# Patient Record
Sex: FEMALE | Race: WHITE | NOT HISPANIC OR LATINO
[De-identification: names, ages, dates, MRNs, and addresses within clinical notes are randomized per-mention and may not be internally consistent; named-entity substitution may affect disease eponyms.]

---

## 2017-12-05 ENCOUNTER — APPOINTMENT (OUTPATIENT)
Dept: ORTHOPEDIC SURGERY | Facility: CLINIC | Age: 62
End: 2017-12-05
Payer: COMMERCIAL

## 2017-12-05 VITALS
WEIGHT: 158 LBS | BODY MASS INDEX: 23.95 KG/M2 | HEART RATE: 66 BPM | SYSTOLIC BLOOD PRESSURE: 115 MMHG | HEIGHT: 68 IN | DIASTOLIC BLOOD PRESSURE: 73 MMHG

## 2017-12-05 DIAGNOSIS — Z78.9 OTHER SPECIFIED HEALTH STATUS: ICD-10-CM

## 2017-12-05 DIAGNOSIS — Z80.9 FAMILY HISTORY OF MALIGNANT NEOPLASM, UNSPECIFIED: ICD-10-CM

## 2017-12-05 DIAGNOSIS — M18.0 BILATERAL PRIMARY OSTEOARTHRITIS OF FIRST CARPOMETACARPAL JOINTS: ICD-10-CM

## 2017-12-05 PROCEDURE — 99203 OFFICE O/P NEW LOW 30 MIN: CPT

## 2017-12-05 PROCEDURE — 73130 X-RAY EXAM OF HAND: CPT | Mod: 50

## 2017-12-05 RX ORDER — LEVOTHYROXINE SODIUM 125 UG/1
125 TABLET ORAL
Qty: 90 | Refills: 0 | Status: ACTIVE | COMMUNITY
Start: 2017-11-17

## 2017-12-05 RX ORDER — MELOXICAM 7.5 MG/1
7.5 TABLET ORAL
Qty: 1 | Refills: 2 | Status: ACTIVE | COMMUNITY
Start: 2017-12-05 | End: 1900-01-01

## 2017-12-05 RX ORDER — SODIUM SULFATE, POTASSIUM SULFATE, MAGNESIUM SULFATE 17.5; 3.13; 1.6 G/ML; G/ML; G/ML
17.5-3.13-1.6 SOLUTION, CONCENTRATE ORAL
Qty: 354 | Refills: 0 | Status: ACTIVE | COMMUNITY
Start: 2017-10-25

## 2017-12-05 RX ORDER — BETAMETHASONE DIPROPIONATE 0.5 MG/G
0.05 OINTMENT TOPICAL
Qty: 45 | Refills: 0 | Status: ACTIVE | COMMUNITY
Start: 2017-11-16

## 2017-12-05 RX ORDER — CLONAZEPAM 1 MG/1
1 TABLET ORAL
Qty: 60 | Refills: 0 | Status: ACTIVE | COMMUNITY
Start: 2017-11-16

## 2022-06-28 ENCOUNTER — APPOINTMENT (OUTPATIENT)
Dept: ORTHOPEDIC SURGERY | Facility: CLINIC | Age: 67
End: 2022-06-28

## 2022-06-28 VITALS — HEIGHT: 68 IN | BODY MASS INDEX: 23.34 KG/M2 | WEIGHT: 154 LBS

## 2022-06-28 DIAGNOSIS — Z00.00 ENCOUNTER FOR GENERAL ADULT MEDICAL EXAMINATION W/OUT ABNORMAL FINDINGS: ICD-10-CM

## 2022-06-28 DIAGNOSIS — M77.02 MEDIAL EPICONDYLITIS, LEFT ELBOW: ICD-10-CM

## 2022-06-28 PROCEDURE — 73080 X-RAY EXAM OF ELBOW: CPT | Mod: RT

## 2022-06-28 PROCEDURE — 99203 OFFICE O/P NEW LOW 30 MIN: CPT

## 2022-06-28 RX ORDER — DICLOFENAC SODIUM 1% 10 MG/G
1 GEL TOPICAL DAILY
Qty: 1 | Refills: 1 | Status: ACTIVE | COMMUNITY
Start: 2022-06-28 | End: 1900-01-01

## 2022-06-28 NOTE — PHYSICAL EXAM
[Right] : right elbow [NL (150)] : flexion 150 degrees [NL (0)] : extension 0 degrees [NL (90)] : supination 90 degrees [5___] : supination 5[unfilled]/5 [] : motor exam 5/5  [Left] : left elbow [There are no fractures, subluxations or dislocations. No significant abnormalities are seen] : There are no fractures, subluxations or dislocations. No significant abnormalities are seen

## 2022-06-28 NOTE — HISTORY OF PRESENT ILLNESS
[5] : 5 [0] : 0 [Dull/Aching] : dull/aching [Intermittent] : intermittent [de-identified] : 06/28/2022 Ms. LOS MICHAEL,  66 year female , presents today for right elbow. Reports medial right elbow pain. Pain when lifting or carrying objects. [] : no [FreeTextEntry1] : right elbow [FreeTextEntry5] : no known injury. pt states that she cannot lift weights because her right elbow will start to hurt [de-identified] : lifting weights

## 2022-06-28 NOTE — DISCUSSION/SUMMARY
[de-identified] : 66f with left elbow medial epicondylitis\par 1) diclofenac gel rx\par 2) counterforce brace\par 3) cryotherapy, rest and activity modification\par 4) home exercise info provided\par 5) discussed availability of csi\par \par Entered by Ina Delacruz acting as scribe.\par \par

## 2022-07-05 ENCOUNTER — FORM ENCOUNTER (OUTPATIENT)
Age: 67
End: 2022-07-05

## 2023-01-10 ENCOUNTER — NON-APPOINTMENT (OUTPATIENT)
Age: 68
End: 2023-01-10

## 2023-01-10 ENCOUNTER — APPOINTMENT (OUTPATIENT)
Dept: OTOLARYNGOLOGY | Facility: CLINIC | Age: 68
End: 2023-01-10
Payer: MEDICARE

## 2023-01-10 VITALS
HEIGHT: 68 IN | SYSTOLIC BLOOD PRESSURE: 105 MMHG | DIASTOLIC BLOOD PRESSURE: 70 MMHG | HEART RATE: 76 BPM | BODY MASS INDEX: 23.95 KG/M2 | WEIGHT: 158 LBS

## 2023-01-10 PROCEDURE — 99203 OFFICE O/P NEW LOW 30 MIN: CPT

## 2023-01-10 PROCEDURE — 92588 EVOKED AUDITORY TST COMPLETE: CPT

## 2023-01-10 PROCEDURE — 92557 COMPREHENSIVE HEARING TEST: CPT

## 2023-01-10 PROCEDURE — 92550 TYMPANOMETRY & REFLEX THRESH: CPT

## 2023-01-10 NOTE — ADDENDUM
[FreeTextEntry1] : Documented by Sebastian Gilliam acting as scribe for Dr. Romero on 01/10/2023.\par \par All Medical record entries made by the Scribe were at my, Dr. Romero, direction and personally dictated by me on 01/10/2023. I have reviewed the chart and agree that the record accurately reflects my personal performance of the history, physical exam, assessment and plan. I have also personally directed, reviewed, and agreed with the discharge instructions.

## 2023-01-10 NOTE — HISTORY OF PRESENT ILLNESS
[de-identified] : Pt presents today c/o tinnitus b/l. Describes the noise as a solid noise. Pt cannot remember when the onset of her tinnitus was. Pt notes that a few years ago she had ear otalgia which self resolved. Pt reports that she went to an outside audiologist who found a slight hearing loss in both ears. Denies recent noise exposure, loud noises, drinking tonic water.

## 2023-01-10 NOTE — PHYSICAL EXAM
[] : septum deviated to the right [Normal] : mucosa is normal [Midline] : trachea located in midline position [Removed] : palatine tonsils previously removed [de-identified] : thyroid scar [FreeTextEntry5] : no response to tuning forks [de-identified] : inflamed, swollen turbs [FreeTextEntry2] : Sinuses nontender to percussion. Sensations intact.

## 2023-01-10 NOTE — ASSESSMENT
[FreeTextEntry1] : Reviewed and reconciled medications, allergies, PMHx, PSHx, SocHx, FMHx. \par \par h/o thyroidectomy\par b/l tinnitus\par \par Audio: Tymps: Type A, au\par ETF: Normal, au \par Borderline -8khz w/ mild snhl dip at 1khz, au \par b/l 100% discrim at 65db\par \par Plan:\par Audio - results interpreted by Dr. Romero and reviewed with the patient. ABR. Start Lipoflavonoids after ABR. FU after test.

## 2023-01-10 NOTE — CONSULT LETTER
[Dear  ___] : Dear  [unfilled], [Consult Letter:] : I had the pleasure of evaluating your patient, [unfilled]. [Please see my note below.] : Please see my note below. [Consult Closing:] : Thank you very much for allowing me to participate in the care of this patient.  If you have any questions, please do not hesitate to contact me. [Sincerely,] : Sincerely, [FreeTextEntry3] : Willis Romero MD FACS

## 2023-01-10 NOTE — DATA REVIEWED
[de-identified] : Tymps: Type A, au\par ETF: Normal, au \par Borderline -8khz w/ mild snhl dip at 1khz, au

## 2023-01-18 ENCOUNTER — TRANSCRIPTION ENCOUNTER (OUTPATIENT)
Age: 68
End: 2023-01-18

## 2023-01-18 ENCOUNTER — APPOINTMENT (OUTPATIENT)
Dept: OTOLARYNGOLOGY | Facility: CLINIC | Age: 68
End: 2023-01-18
Payer: MEDICARE

## 2023-01-18 PROCEDURE — 92653 AEP NEURODIAGNOSTIC I&R: CPT

## 2023-01-24 ENCOUNTER — APPOINTMENT (OUTPATIENT)
Dept: OTOLARYNGOLOGY | Facility: CLINIC | Age: 68
End: 2023-01-24
Payer: MEDICARE

## 2023-01-24 VITALS
BODY MASS INDEX: 23.79 KG/M2 | HEIGHT: 68 IN | SYSTOLIC BLOOD PRESSURE: 112 MMHG | DIASTOLIC BLOOD PRESSURE: 78 MMHG | WEIGHT: 157 LBS | HEART RATE: 66 BPM

## 2023-01-24 DIAGNOSIS — E03.2 HYPOTHYROIDISM DUE TO MEDICAMENTS AND OTHER EXOGENOUS SUBSTANCES: ICD-10-CM

## 2023-01-24 PROCEDURE — 99214 OFFICE O/P EST MOD 30 MIN: CPT

## 2023-01-24 NOTE — CONSULT LETTER
[Dear  ___] : Dear  [unfilled], [Courtesy Letter:] : I had the pleasure of seeing your patient, [unfilled], in my office today. [Please see my note below.] : Please see my note below. [Consult Closing:] : Thank you very much for allowing me to participate in the care of this patient.  If you have any questions, please do not hesitate to contact me. [Sincerely,] : Sincerely, [FreeTextEntry1] : Willis Romero MD FACS

## 2023-01-24 NOTE — HISTORY OF PRESENT ILLNESS
[de-identified] : Pt. with h/o b/l tinnitus and HL presents today for ABR results. Patient states she doesn’t notice any problem with her hearing, just the tinnitus. Patient states it dosent bother her enough at this point to do anything.

## 2023-01-24 NOTE — ASSESSMENT
[FreeTextEntry1] : Reviewed and reconciled medications, allergies, PMHx, PSHx, SocHx, FMHx \par \par Pt. with h/o b/l tinnitus and HL presents today for ABR results. Patient states she doesn’t notice any problem with her hearing, just the tinnitus. Patient states it dosent bother her enough at this point to do anything. \par \par Audio 01/10/23 : Tymps: Type A, au\par -acoustic notch\par ETF: Normal, au \par Borderline -8khz w/ mild snhl dip at 1khz, au \par b/l 100% discrim at 65db\par \par ABR 01/18/23:\par -normal\par \par Plan: ABR results discussed with patient. Start Lipoflavinoids and Arch Tinnitus Formula. Use white noise machine. \par \par \par

## 2023-04-17 ENCOUNTER — APPOINTMENT (OUTPATIENT)
Dept: ORTHOPEDIC SURGERY | Facility: CLINIC | Age: 68
End: 2023-04-17
Payer: MEDICARE

## 2023-04-17 DIAGNOSIS — S92.211A DISPLACED FRACTURE OF CUBOID BONE OF RIGHT FOOT, INITIAL ENCOUNTER FOR CLOSED FRACTURE: ICD-10-CM

## 2023-04-17 DIAGNOSIS — S93.321A: ICD-10-CM

## 2023-04-17 DIAGNOSIS — S92.221A: ICD-10-CM

## 2023-04-17 DIAGNOSIS — S92.231A: ICD-10-CM

## 2023-04-17 DIAGNOSIS — S92.321A DISPLACED FRACTURE OF SECOND METATARSAL BONE, RIGHT FOOT, INITIAL ENCOUNTER FOR CLOSED FRACTURE: ICD-10-CM

## 2023-04-17 DIAGNOSIS — S92.241A DISPLACED FRACTURE OF MEDIAL CUNEIFORM OF RIGHT FOOT, INITIAL ENCOUNTER FOR CLOSED FRACTURE: ICD-10-CM

## 2023-04-17 PROCEDURE — 99215 OFFICE O/P EST HI 40 MIN: CPT

## 2023-04-17 PROCEDURE — 73630 X-RAY EXAM OF FOOT: CPT | Mod: RT

## 2023-04-17 NOTE — HISTORY OF PRESENT ILLNESS
[10] : 10 [5] : 5 [Throbbing] : throbbing [de-identified] : 4/17/23: fall 1 week ago w/ foot pain. [facility]. no prior sig foot probs. denies dm/tob.  [] : no [FreeTextEntry1] : RT Foot [FreeTextEntry3] : 4/10/2023 [FreeTextEntry5] : Keri Flower is a 67 Y.O female coming in with RIGHT FOOT pain. She states she fell over a baby gate last week and twisted the foot. Denies Numbness/tingling  [de-identified] : X-ray, CT

## 2023-04-17 NOTE — PHYSICAL EXAM
[Right] : right foot and ankle [Moderate] : moderate swelling of dorsal foot [5___] : [facility] 5[unfilled]/5 [2+] : dorsalis pedis pulse: 2+ [] : no achilles tendon tenderness [de-identified] : knee scooter [de-identified] : plantar flexion 30 degrees [TWNoteComboBox7] : dorsiflexion 10 degrees

## 2023-04-17 NOTE — ASSESSMENT
[FreeTextEntry1] : nwb in cam boot\par discussed dx at length\par given displacement and comminution of 2nd tmt joint, recommend orif with arthrodesis\par discussed surgical and non surgical options and pros/cons for both\par all questions answered and would like to proceed\par elevate\par tylenol prn\par \par The pros, cons, risks, benefits, alternatives have been discussed.  The risks include but are not limited to infection, bleeding, injury to small nerves and blood vessels, pain, stiffness, progression, over correction, under correction, recurrence, hardware failure, need for additional procedures, dvt, PE, amputation and death. Expected recovery time was discussed. All the patient's questions were answered and they would like to proceed.\par

## 2023-04-17 NOTE — DATA REVIEWED
[CT Scan] : CT scan [Right] : of the right [Foot] : foot [I reviewed the films/CD and additionally noted] : I reviewed the films/CD and additionally noted [FreeTextEntry1] : prox 2nd mt and mid and med and lat cuneiform fxs w/ subluxation of 2nd TMT; cuboid fx

## 2023-04-21 ENCOUNTER — OUTPATIENT (OUTPATIENT)
Dept: OUTPATIENT SERVICES | Facility: HOSPITAL | Age: 68
LOS: 1 days | End: 2023-04-21
Payer: MEDICARE

## 2023-04-21 VITALS
SYSTOLIC BLOOD PRESSURE: 96 MMHG | HEART RATE: 90 BPM | TEMPERATURE: 98 F | WEIGHT: 156.97 LBS | RESPIRATION RATE: 14 BRPM | DIASTOLIC BLOOD PRESSURE: 50 MMHG | HEIGHT: 68 IN | OXYGEN SATURATION: 99 %

## 2023-04-21 DIAGNOSIS — S92.309A FRACTURE OF UNSPECIFIED METATARSAL BONE(S), UNSPECIFIED FOOT, INITIAL ENCOUNTER FOR CLOSED FRACTURE: ICD-10-CM

## 2023-04-21 DIAGNOSIS — E89.0 POSTPROCEDURAL HYPOTHYROIDISM: Chronic | ICD-10-CM

## 2023-04-21 DIAGNOSIS — Z01.818 ENCOUNTER FOR OTHER PREPROCEDURAL EXAMINATION: ICD-10-CM

## 2023-04-21 DIAGNOSIS — S93.321A SUBLUXATION OF TARSOMETATARSAL JOINT OF RIGHT FOOT, INITIAL ENCOUNTER: ICD-10-CM

## 2023-04-21 DIAGNOSIS — Z90.710 ACQUIRED ABSENCE OF BOTH CERVIX AND UTERUS: Chronic | ICD-10-CM

## 2023-04-21 LAB
ANION GAP SERPL CALC-SCNC: 7 MMOL/L — SIGNIFICANT CHANGE UP (ref 5–17)
BUN SERPL-MCNC: 18 MG/DL — SIGNIFICANT CHANGE UP (ref 7–23)
CALCIUM SERPL-MCNC: 9.2 MG/DL — SIGNIFICANT CHANGE UP (ref 8.4–10.5)
CHLORIDE SERPL-SCNC: 100 MMOL/L — SIGNIFICANT CHANGE UP (ref 96–108)
CO2 SERPL-SCNC: 30 MMOL/L — SIGNIFICANT CHANGE UP (ref 22–31)
CREAT SERPL-MCNC: 0.8 MG/DL — SIGNIFICANT CHANGE UP (ref 0.5–1.3)
EGFR: 81 ML/MIN/1.73M2 — SIGNIFICANT CHANGE UP
GLUCOSE SERPL-MCNC: 125 MG/DL — HIGH (ref 70–99)
HCT VFR BLD CALC: 42.3 % — SIGNIFICANT CHANGE UP (ref 34.5–45)
HGB BLD-MCNC: 14.4 G/DL — SIGNIFICANT CHANGE UP (ref 11.5–15.5)
MCHC RBC-ENTMCNC: 31.3 PG — SIGNIFICANT CHANGE UP (ref 27–34)
MCHC RBC-ENTMCNC: 34 GM/DL — SIGNIFICANT CHANGE UP (ref 32–36)
MCV RBC AUTO: 92 FL — SIGNIFICANT CHANGE UP (ref 80–100)
NRBC # BLD: 0 /100 WBCS — SIGNIFICANT CHANGE UP (ref 0–0)
PLATELET # BLD AUTO: 329 K/UL — SIGNIFICANT CHANGE UP (ref 150–400)
POTASSIUM SERPL-MCNC: 4.1 MMOL/L — SIGNIFICANT CHANGE UP (ref 3.5–5.3)
POTASSIUM SERPL-SCNC: 4.1 MMOL/L — SIGNIFICANT CHANGE UP (ref 3.5–5.3)
RBC # BLD: 4.6 M/UL — SIGNIFICANT CHANGE UP (ref 3.8–5.2)
RBC # FLD: 12.7 % — SIGNIFICANT CHANGE UP (ref 10.3–14.5)
SODIUM SERPL-SCNC: 137 MMOL/L — SIGNIFICANT CHANGE UP (ref 135–145)
WBC # BLD: 6.84 K/UL — SIGNIFICANT CHANGE UP (ref 3.8–10.5)
WBC # FLD AUTO: 6.84 K/UL — SIGNIFICANT CHANGE UP (ref 3.8–10.5)

## 2023-04-21 PROCEDURE — 36415 COLL VENOUS BLD VENIPUNCTURE: CPT

## 2023-04-21 PROCEDURE — 93005 ELECTROCARDIOGRAM TRACING: CPT

## 2023-04-21 PROCEDURE — 85027 COMPLETE CBC AUTOMATED: CPT

## 2023-04-21 PROCEDURE — 80048 BASIC METABOLIC PNL TOTAL CA: CPT

## 2023-04-21 PROCEDURE — 93010 ELECTROCARDIOGRAM REPORT: CPT

## 2023-04-21 PROCEDURE — G0463: CPT

## 2023-04-21 NOTE — H&P PST ADULT - MUSCULOSKELETAL COMMENTS
right foot on Cama boot ; tender on palpation , pain with ROM right foot; metatarsal fracture right foot on Cama boot ; tender on palpation , swelling noted , pain with ROM, no paresthesia

## 2023-04-21 NOTE — H&P PST ADULT - PROBLEM SELECTOR PLAN 1
scheduled for ORIF right foot metatarsal on 4/26/23  will obtain medical clearance   pre op instructions

## 2023-04-21 NOTE — H&P PST ADULT - NSICDXPASTSURGICALHX_GEN_ALL_CORE_FT
PAST SURGICAL HISTORY:  H/O total hysterectomy      PAST SURGICAL HISTORY:  H/O total hysterectomy     S/P thyroidectomy

## 2023-04-21 NOTE — H&P PST ADULT - NSICDXPROCEDURE_GEN_ALL_CORE_FT
PROCEDURES:  Open reduction and internal fixation of fracture of metatarsal bone of right foot 21-Apr-2023 12:01:01  Ruth Plaza

## 2023-04-21 NOTE — H&P PST ADULT - NSANTHOSAYNRD_GEN_A_CORE
No. ABELARDO screening performed.  STOP BANG Legend: 0-2 = LOW Risk; 3-4 = INTERMEDIATE Risk; 5-8 = HIGH Risk

## 2023-04-21 NOTE — H&P PST ADULT - NSICDXFAMILYHX_GEN_ALL_CORE_FT
FAMILY HISTORY:  Father  Still living? No  FH: diabetes mellitus, Age at diagnosis: Age Unknown  FH: heart disease, Age at diagnosis: Age Unknown    Mother  Still living? No  Family history of CLL (chronic lymphoid leukemia), Age at diagnosis: Age Unknown

## 2023-04-21 NOTE — H&P PST ADULT - HISTORY OF PRESENT ILLNESS
This is a 68 y/o female who sustained right foot fracture presents with right foot pain , swelling and unable to bear weight . patient states she fell over a baby gate and twisted the foot last week . went to ER at [facility] . x ray showed right metatarsal fracture . placed on cama boot  scheduled for right foot ORIF metatarsal fracture on 4/26/23

## 2023-04-21 NOTE — H&P PST ADULT - PRO ARRIVE FROM
Called the patient to get him scheduled to see Dr. Calderon. Couldn't leave a message will try to call back. FP      ----- Message from Rafael Scruggs sent at 6/5/2018 12:15 PM CDT -----  Contact: Pt   Pt called to request an appointment having right knee pain..[phone] (home)         home

## 2023-04-25 ENCOUNTER — TRANSCRIPTION ENCOUNTER (OUTPATIENT)
Age: 68
End: 2023-04-25

## 2023-04-26 ENCOUNTER — OUTPATIENT (OUTPATIENT)
Dept: OUTPATIENT SERVICES | Facility: HOSPITAL | Age: 68
LOS: 1 days | End: 2023-04-26
Payer: MEDICARE

## 2023-04-26 ENCOUNTER — TRANSCRIPTION ENCOUNTER (OUTPATIENT)
Age: 68
End: 2023-04-26

## 2023-04-26 ENCOUNTER — APPOINTMENT (OUTPATIENT)
Dept: ORTHOPEDIC SURGERY | Facility: HOSPITAL | Age: 68
End: 2023-04-26
Payer: MEDICARE

## 2023-04-26 VITALS
HEIGHT: 68 IN | SYSTOLIC BLOOD PRESSURE: 111 MMHG | RESPIRATION RATE: 18 BRPM | DIASTOLIC BLOOD PRESSURE: 66 MMHG | TEMPERATURE: 98 F | OXYGEN SATURATION: 100 % | WEIGHT: 161.82 LBS | HEART RATE: 76 BPM

## 2023-04-26 VITALS
HEART RATE: 80 BPM | TEMPERATURE: 98 F | DIASTOLIC BLOOD PRESSURE: 60 MMHG | OXYGEN SATURATION: 98 % | SYSTOLIC BLOOD PRESSURE: 117 MMHG

## 2023-04-26 DIAGNOSIS — S93.321A SUBLUXATION OF TARSOMETATARSAL JOINT OF RIGHT FOOT, INITIAL ENCOUNTER: ICD-10-CM

## 2023-04-26 DIAGNOSIS — Z01.818 ENCOUNTER FOR OTHER PREPROCEDURAL EXAMINATION: ICD-10-CM

## 2023-04-26 DIAGNOSIS — Z90.710 ACQUIRED ABSENCE OF BOTH CERVIX AND UTERUS: Chronic | ICD-10-CM

## 2023-04-26 DIAGNOSIS — E89.0 POSTPROCEDURAL HYPOTHYROIDISM: Chronic | ICD-10-CM

## 2023-04-26 PROCEDURE — 28485 OPTX METATARSAL FX EACH: CPT | Mod: RT

## 2023-04-26 PROCEDURE — 76000 FLUOROSCOPY <1 HR PHYS/QHP: CPT

## 2023-04-26 PROCEDURE — 28615 REPAIR FOOT DISLOCATION: CPT | Mod: RT

## 2023-04-26 PROCEDURE — 28470 CLTX METATARSAL FX WO MNP EA: CPT | Mod: RT,XS

## 2023-04-26 PROCEDURE — 28450 TX TARSAL B1 FX W/O MNPJ EA: CPT | Mod: RT,XS

## 2023-04-26 PROCEDURE — 28465 OPTX TARSAL BONE FX EACH: CPT | Mod: RT

## 2023-04-26 PROCEDURE — ZZZZZ: CPT

## 2023-04-26 PROCEDURE — C1889: CPT

## 2023-04-26 PROCEDURE — C1734: CPT

## 2023-04-26 PROCEDURE — C1713: CPT

## 2023-04-26 PROCEDURE — 28740 FUSION OF FOOT BONES: CPT | Mod: RT

## 2023-04-26 DEVICE — IMPLANTABLE DEVICE: Type: IMPLANTABLE DEVICE | Site: RIGHT | Status: FUNCTIONAL

## 2023-04-26 DEVICE — K-WIRE WRIGHT MEDICAL (SINGLE TROCAR) 1.6MM X 150MM: Type: IMPLANTABLE DEVICE | Site: RIGHT | Status: FUNCTIONAL

## 2023-04-26 DEVICE — GRAFT BONE AUGMENT INJ 3ML SYNTHETIC: Type: IMPLANTABLE DEVICE | Site: RIGHT | Status: FUNCTIONAL

## 2023-04-26 RX ORDER — ALENDRONATE SODIUM 70 MG/1
1 TABLET ORAL
Refills: 0 | DISCHARGE

## 2023-04-26 RX ORDER — HYDROMORPHONE HYDROCHLORIDE 2 MG/ML
1 INJECTION INTRAMUSCULAR; INTRAVENOUS; SUBCUTANEOUS
Refills: 0 | Status: DISCONTINUED | OUTPATIENT
Start: 2023-04-26 | End: 2023-04-27

## 2023-04-26 RX ORDER — ASPIRIN/CALCIUM CARB/MAGNESIUM 324 MG
1 TABLET ORAL
Qty: 30 | Refills: 0
Start: 2023-04-26

## 2023-04-26 RX ORDER — ONDANSETRON 8 MG/1
4 TABLET, FILM COATED ORAL ONCE
Refills: 0 | Status: DISCONTINUED | OUTPATIENT
Start: 2023-04-26 | End: 2023-04-27

## 2023-04-26 RX ORDER — HYDROCODONE BITARTRATE AND ACETAMINOPHEN 7.5; 325 MG/15ML; MG/15ML
1 SOLUTION ORAL
Qty: 20 | Refills: 0
Start: 2023-04-26

## 2023-04-26 RX ORDER — HYDROMORPHONE HYDROCHLORIDE 2 MG/ML
0.5 INJECTION INTRAMUSCULAR; INTRAVENOUS; SUBCUTANEOUS
Refills: 0 | Status: DISCONTINUED | OUTPATIENT
Start: 2023-04-26 | End: 2023-04-27

## 2023-04-26 RX ORDER — LEVOTHYROXINE SODIUM 125 MCG
1 TABLET ORAL
Refills: 0 | DISCHARGE

## 2023-04-26 RX ORDER — ACETAMINOPHEN 500 MG
1000 TABLET ORAL ONCE
Refills: 0 | Status: COMPLETED | OUTPATIENT
Start: 2023-04-26 | End: 2023-04-26

## 2023-04-26 RX ORDER — APREPITANT 80 MG/1
40 CAPSULE ORAL ONCE
Refills: 0 | Status: COMPLETED | OUTPATIENT
Start: 2023-04-26 | End: 2023-04-26

## 2023-04-26 RX ORDER — SODIUM CHLORIDE 9 MG/ML
1000 INJECTION, SOLUTION INTRAVENOUS
Refills: 0 | Status: DISCONTINUED | OUTPATIENT
Start: 2023-04-26 | End: 2023-04-27

## 2023-04-26 RX ORDER — OXYCODONE HYDROCHLORIDE 5 MG/1
5 TABLET ORAL ONCE
Refills: 0 | Status: DISCONTINUED | OUTPATIENT
Start: 2023-04-26 | End: 2023-04-27

## 2023-04-26 RX ORDER — CEFAZOLIN SODIUM 1 G
2000 VIAL (EA) INJECTION ONCE
Refills: 0 | Status: COMPLETED | OUTPATIENT
Start: 2023-04-26 | End: 2023-04-26

## 2023-04-26 RX ORDER — CHLORHEXIDINE GLUCONATE 213 G/1000ML
1 SOLUTION TOPICAL ONCE
Refills: 0 | Status: COMPLETED | OUTPATIENT
Start: 2023-04-26 | End: 2023-04-26

## 2023-04-26 RX ADMIN — HYDROMORPHONE HYDROCHLORIDE 1 MILLIGRAM(S): 2 INJECTION INTRAMUSCULAR; INTRAVENOUS; SUBCUTANEOUS at 18:14

## 2023-04-26 RX ADMIN — APREPITANT 40 MILLIGRAM(S): 80 CAPSULE ORAL at 13:37

## 2023-04-26 RX ADMIN — HYDROMORPHONE HYDROCHLORIDE 1 MILLIGRAM(S): 2 INJECTION INTRAMUSCULAR; INTRAVENOUS; SUBCUTANEOUS at 17:58

## 2023-04-26 RX ADMIN — CHLORHEXIDINE GLUCONATE 1 APPLICATION(S): 213 SOLUTION TOPICAL at 13:38

## 2023-04-26 NOTE — ASU DISCHARGE PLAN (ADULT/PEDIATRIC) - CARE PROVIDER_API CALL
Mykel Shipman)  Orthopaedic Surgery  [address] [place] [address]  Phone: [phone]  Fax: [phone]  Follow Up Time: 2 weeks

## 2023-04-26 NOTE — BRIEF OPERATIVE NOTE - NSICDXBRIEFPROCEDURE_GEN_ALL_CORE_FT
PROCEDURES:  Open reduction and internal fixation (ORIF) of fracture of metatarsal bone of right foot with repair 26-Apr-2023 17:57:17  Sailaja Goode

## 2023-04-26 NOTE — ASU DISCHARGE PLAN (ADULT/PEDIATRIC) - ASU DC SPECIAL INSTRUCTIONSFT
Non weight bearing right lower extremity, Ambulate with crutches or a walker  Elevate right foot on blankets above heart level (toes above the nose) for 1 hour 3 times a day.  Apply ice pack to  foot for 30 minutes every 3 hours daily.  Keep bandage clean and dry daily.  Cover right foot in shower daily with plastic bag & tape ( or cast bag ) to keep dry.  Wear protective shoe on  foot daily  See the Surgeon in the office in 7-10 days.  Call the Surgeon for fever, severe foot pain.

## 2023-04-26 NOTE — BRIEF OPERATIVE NOTE - NSICDXBRIEFPREOP_GEN_ALL_CORE_FT
PRE-OP DIAGNOSIS:  Lisfranc's dislocation, right, initial encounter 26-Apr-2023 17:59:11  Sailaja Goode

## 2023-04-26 NOTE — BRIEF OPERATIVE NOTE - NSICDXBRIEFPOSTOP_GEN_ALL_CORE_FT
POST-OP DIAGNOSIS:  Lisfranc's dislocation, right, initial encounter 26-Apr-2023 17:58:50  Sailaja Goode

## 2023-05-08 ENCOUNTER — APPOINTMENT (OUTPATIENT)
Dept: ORTHOPEDIC SURGERY | Facility: CLINIC | Age: 68
End: 2023-05-08
Payer: MEDICARE

## 2023-05-08 VITALS — WEIGHT: 157 LBS | BODY MASS INDEX: 23.79 KG/M2 | HEIGHT: 68 IN

## 2023-05-08 PROBLEM — E03.9 HYPOTHYROIDISM, UNSPECIFIED: Chronic | Status: ACTIVE | Noted: 2023-04-21

## 2023-05-08 PROBLEM — M85.80 OTHER SPECIFIED DISORDERS OF BONE DENSITY AND STRUCTURE, UNSPECIFIED SITE: Chronic | Status: ACTIVE | Noted: 2023-04-21

## 2023-05-08 PROBLEM — S92.309A FRACTURE OF UNSPECIFIED METATARSAL BONE(S), UNSPECIFIED FOOT, INITIAL ENCOUNTER FOR CLOSED FRACTURE: Chronic | Status: ACTIVE | Noted: 2023-04-21

## 2023-05-08 PROCEDURE — 99024 POSTOP FOLLOW-UP VISIT: CPT

## 2023-05-08 NOTE — PHYSICAL EXAM
[Right] : right foot and ankle [5___] : [facility] 5[unfilled]/5 [2+] : dorsalis pedis pulse: 2+ [] : Sensation present to light touch in all distributions [de-identified] : rollator

## 2023-05-08 NOTE — ASSESSMENT
[FreeTextEntry1] : nwb in cam boot\par keep incision dry\par advised stop opioid at this stage\par advise otc motrin and tylenol\par f/up 2 wks w/ foot xray

## 2023-05-08 NOTE — HISTORY OF PRESENT ILLNESS
[de-identified] : 4/17/23: fall 1 week ago w/ foot pain[facility]. no prior sig foot probs. denies dm/tob. \par \par 04/26/2023:ORIF LIsfranc with 2nd tmt fusion\par \par 05/08/2023: no complaints. nwb in splint. Patient denies fevers, chills, or drainage.\par  [] : This patient has had an injection before: no [FreeTextEntry1] : RT Foot [de-identified] : X-ray, CT [de-identified] : 4.26.23 [de-identified] : RT foot ORIF

## 2023-05-12 ENCOUNTER — NON-APPOINTMENT (OUTPATIENT)
Age: 68
End: 2023-05-12

## 2023-05-22 ENCOUNTER — RESULT CHARGE (OUTPATIENT)
Age: 68
End: 2023-05-22

## 2023-05-22 ENCOUNTER — APPOINTMENT (OUTPATIENT)
Dept: ORTHOPEDIC SURGERY | Facility: CLINIC | Age: 68
End: 2023-05-22
Payer: MEDICARE

## 2023-05-22 VITALS — HEIGHT: 68 IN | BODY MASS INDEX: 23.79 KG/M2 | WEIGHT: 157 LBS

## 2023-05-22 PROCEDURE — 99024 POSTOP FOLLOW-UP VISIT: CPT

## 2023-05-22 PROCEDURE — 73630 X-RAY EXAM OF FOOT: CPT | Mod: RT

## 2023-05-22 NOTE — HISTORY OF PRESENT ILLNESS
[0] : 0 [de-identified] : 4/17/23: fall 1 week ago w/ foot pain. [facility]. no prior sig foot probs. denies dm/tob. \par \par 04/26/2023:ORIF LIsfranc with 2nd tmt fusion\par \par 05/08/2023: no complaints. nwb in splint. Patient denies fevers, chills, or drainage.\par \par 05/22/2023: no complaints. nwb in boot and scooter. Patient denies fevers, chills, or drainage.  taking asa\par  [] : This patient has had an injection before: no [FreeTextEntry1] : RT Foot [de-identified] : boot and scooter [de-identified] : X-ray, CT [de-identified] : 4.26.23 [de-identified] : RT foot ORIF

## 2023-05-22 NOTE — ASSESSMENT
[FreeTextEntry1] : nwb in cam boot\par discussed wound care\par ice/elevate\par tylenol prn\par f/up 2 wks w/ foot xray

## 2023-05-22 NOTE — PHYSICAL EXAM
[Right] : right foot and ankle [5___] : [facility] 5[unfilled]/5 [2+] : dorsalis pedis pulse: 2+ [] : no calf tenderness [de-identified] : rollator

## 2023-06-05 ENCOUNTER — APPOINTMENT (OUTPATIENT)
Dept: ORTHOPEDIC SURGERY | Facility: CLINIC | Age: 68
End: 2023-06-05
Payer: MEDICARE

## 2023-06-05 VITALS — HEIGHT: 68 IN | BODY MASS INDEX: 23.79 KG/M2 | WEIGHT: 157 LBS

## 2023-06-05 PROCEDURE — 99024 POSTOP FOLLOW-UP VISIT: CPT

## 2023-06-05 PROCEDURE — 73630 X-RAY EXAM OF FOOT: CPT | Mod: RT

## 2023-06-05 NOTE — HISTORY OF PRESENT ILLNESS
[0] : 0 [de-identified] : 4/17/23: fall 1 week ago w/ foot pain[facility]. no prior sig foot probs. denies dm/tob. \par \par 04/26/2023:ORIF LIsfranc with 2nd tmt fusion\par \par 05/08/2023: no complaints. nwb in splint. Patient denies fevers, chills, or drainage.\par \par 05/22/2023: no complaints. nwb in boot and scooter. Patient denies fevers, chills, or drainage.  taking asa\par \par 06/05/2023: no complaints. nwb in boot and scooter. Patient denies fevers, chills, drainage.  [] : This patient has had an injection before: no [FreeTextEntry1] : RT Foot [de-identified] : boot and scooter [de-identified] : X-ray, CT [de-identified] : 4.26.23 [de-identified] : RT foot ORIF

## 2023-06-05 NOTE — PHYSICAL EXAM
[Right] : right foot and ankle [2+] : dorsalis pedis pulse: 2+ [] : no calf tenderness [5___] : plantar flexion 5[unfilled]/5 [FreeTextEntry8] : no sig ttp [de-identified] : rollator

## 2023-06-05 NOTE — ASSESSMENT
[FreeTextEntry1] : progressive wb back to wbat in boot\par begin PT\par ice/elevate\par nsaids prn\par f/up 3 wks w/ foot xray

## 2023-06-26 ENCOUNTER — APPOINTMENT (OUTPATIENT)
Dept: ORTHOPEDIC SURGERY | Facility: CLINIC | Age: 68
End: 2023-06-26
Payer: MEDICARE

## 2023-06-26 PROCEDURE — 99024 POSTOP FOLLOW-UP VISIT: CPT

## 2023-06-26 PROCEDURE — 73630 X-RAY EXAM OF FOOT: CPT | Mod: RT

## 2023-06-26 NOTE — PHYSICAL EXAM
[Right] : right foot and ankle [5___] : [facility] 5[unfilled]/5 [2+] : dorsalis pedis pulse: 2+ [] : ambulation with crutches [FreeTextEntry8] : no sig ttp [de-identified] : plantar flexion 30 degrees [TWNoteComboBox7] : dorsiflexion 10 degrees

## 2023-06-26 NOTE — HISTORY OF PRESENT ILLNESS
[0] : 0 [de-identified] : 4/17/23: fall 1 week ago w/ foot pain[facility]. no prior sig foot probs. denies dm/tob. \par \par 04/26/2023:ORIF LIsfranc with 2nd tmt fusion\par \par 05/08/2023: no complaints. nwb in splint. Patient denies fevers, chills, or drainage.\par \par 05/22/2023: no complaints. nwb in boot and scooter. Patient denies fevers, chills, or drainage.  taking asa\par \par 06/05/2023: no complaints. nwb in boot and scooter. Patient denies fevers, chills, drainage. \par \par 06/26/2023:  improving. partial wb in boot. going to PT. Patient denies fevers, chills, or drainage.\par  [] : This patient has had an injection before: no [FreeTextEntry1] : RT Foot [de-identified] : boot and scooter [de-identified] : X-ray, CT [de-identified] : 4.26.23 [de-identified] : RT foot ORIF

## 2023-07-11 ENCOUNTER — APPOINTMENT (OUTPATIENT)
Dept: ORTHOPEDIC SURGERY | Facility: CLINIC | Age: 68
End: 2023-07-11

## 2023-07-17 ENCOUNTER — APPOINTMENT (OUTPATIENT)
Dept: ORTHOPEDIC SURGERY | Facility: CLINIC | Age: 68
End: 2023-07-17
Payer: MEDICARE

## 2023-07-17 ENCOUNTER — RESULT CHARGE (OUTPATIENT)
Age: 68
End: 2023-07-17

## 2023-07-17 VITALS — WEIGHT: 155 LBS | BODY MASS INDEX: 23.49 KG/M2 | HEIGHT: 68 IN

## 2023-07-17 PROCEDURE — 99024 POSTOP FOLLOW-UP VISIT: CPT

## 2023-07-17 PROCEDURE — 73630 X-RAY EXAM OF FOOT: CPT | Mod: RT

## 2023-07-17 NOTE — ASSESSMENT
[FreeTextEntry1] : wbat\par supportive shoe\par ice/elevate\par nsaids prn\par PT\par f/up 6 wks w/ foot xray

## 2023-07-17 NOTE — PHYSICAL EXAM
[Right] : right foot and ankle [2+] : dorsalis pedis pulse: 2+ [NL (40)] : plantar flexion 40 degrees [NL 30)] : inversion 30 degrees [NL (20)] : eversion 20 degrees [5___] : eversion 5[unfilled]/5 [] : patient ambulates without assistive device [FreeTextEntry8] : no sig ttp [de-identified] : plantar flexion 30 degrees [TWNoteComboBox7] : dorsiflexion 15 degrees

## 2023-07-17 NOTE — HISTORY OF PRESENT ILLNESS
[1] : 2 [0] : 0 [Throbbing] : throbbing [de-identified] : 4/17/23: fall 1 week ago w/ foot pain[facility]. no prior sig foot probs. denies dm/tob. \par \par 04/26/2023:ORIF LIsfranc with 2nd tmt fusion\par \par 05/08/2023: no complaints. nwb in splint. Patient denies fevers, chills, or drainage.\par \par 05/22/2023: no complaints. nwb in boot and scooter. Patient denies fevers, chills, or drainage.  taking asa\par \par 06/05/2023: no complaints. nwb in boot and scooter. Patient denies fevers, chills, drainage. \par \par 06/26/2023:  improving. partial wb in boot. going to PT. Patient denies fevers, chills, or drainage.\par \par 07/17/2023: improving. walking in boot. going to PT. Patient denies fevers, chills, or drainage.\par  [] : This patient has had an injection before: no [FreeTextEntry1] : RT Foot [de-identified] : boot and scooter [de-identified] : X-ray, CT [de-identified] : 4.26.23 [de-identified] : RT foot ORIF

## 2023-07-17 NOTE — HISTORY OF PRESENT ILLNESS
[1] : 2 [0] : 0 [Throbbing] : throbbing [de-identified] : 4/17/23: fall 1 week ago w/ foot pain[facility]. no prior sig foot probs. denies dm/tob. \par \par 04/26/2023:ORIF LIsfranc with 2nd tmt fusion\par \par 05/08/2023: no complaints. nwb in splint. Patient denies fevers, chills, or drainage.\par \par 05/22/2023: no complaints. nwb in boot and scooter. Patient denies fevers, chills, or drainage.  taking asa\par \par 06/05/2023: no complaints. nwb in boot and scooter. Patient denies fevers, chills, drainage. \par \par 06/26/2023:  improving. partial wb in boot. going to PT. Patient denies fevers, chills, or drainage.\par \par 07/17/2023: improving. walking in boot. going to PT. Patient denies fevers, chills, or drainage.\par  [] : This patient has had an injection before: no [FreeTextEntry1] : RT Foot [de-identified] : boot and scooter [de-identified] : X-ray, CT [de-identified] : 4.26.23 [de-identified] : RT foot ORIF
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
## 2023-08-28 ENCOUNTER — NON-APPOINTMENT (OUTPATIENT)
Age: 68
End: 2023-08-28

## 2023-09-06 ENCOUNTER — APPOINTMENT (OUTPATIENT)
Dept: ORTHOPEDIC SURGERY | Facility: CLINIC | Age: 68
End: 2023-09-06
Payer: MEDICARE

## 2023-09-06 VITALS — BODY MASS INDEX: 23.49 KG/M2 | HEIGHT: 68 IN | WEIGHT: 155 LBS

## 2023-09-06 PROCEDURE — 99213 OFFICE O/P EST LOW 20 MIN: CPT

## 2023-09-06 PROCEDURE — 73630 X-RAY EXAM OF FOOT: CPT | Mod: RT

## 2023-09-06 NOTE — ASSESSMENT
[FreeTextEntry1] : wbat supportive shoe ice/elevate nsaids prn would like to continue PT f/up 3 months w/ foot xray

## 2023-09-06 NOTE — PHYSICAL EXAM
[Right] : right foot and ankle [NL (40)] : plantar flexion 40 degrees [NL 30)] : inversion 30 degrees [5___] : [facility] 5[unfilled]/5 [2+] : dorsalis pedis pulse: 2+ [Mild] : mild swelling of dorsal foot [NL (20)] : dorsiflexion 20 degrees [] : no pain on mid-foot stress [FreeTextEntry8] : no sig ttp

## 2023-11-03 ENCOUNTER — APPOINTMENT (OUTPATIENT)
Dept: ORTHOPEDIC SURGERY | Facility: CLINIC | Age: 68
End: 2023-11-03
Payer: MEDICARE

## 2023-11-03 VITALS — WEIGHT: 160 LBS | HEIGHT: 68 IN | BODY MASS INDEX: 24.25 KG/M2

## 2023-11-03 DIAGNOSIS — Z78.9 OTHER SPECIFIED HEALTH STATUS: ICD-10-CM

## 2023-11-03 DIAGNOSIS — M76.31 ILIOTIBIAL BAND SYNDROME, RIGHT LEG: ICD-10-CM

## 2023-11-03 PROCEDURE — 99214 OFFICE O/P EST MOD 30 MIN: CPT

## 2023-11-03 PROCEDURE — 73502 X-RAY EXAM HIP UNI 2-3 VIEWS: CPT

## 2023-11-03 PROCEDURE — 72110 X-RAY EXAM L-2 SPINE 4/>VWS: CPT

## 2023-11-03 RX ORDER — MELOXICAM 15 MG/1
15 TABLET ORAL DAILY
Qty: 21 | Refills: 0 | Status: ACTIVE | COMMUNITY
Start: 2023-11-03 | End: 1900-01-01

## 2023-11-14 ENCOUNTER — APPOINTMENT (OUTPATIENT)
Dept: ORTHOPEDIC SURGERY | Facility: CLINIC | Age: 68
End: 2023-11-14
Payer: MEDICARE

## 2023-11-14 VITALS — BODY MASS INDEX: 24.25 KG/M2 | HEIGHT: 68 IN | WEIGHT: 160 LBS

## 2023-11-14 DIAGNOSIS — G57.01 LESION OF SCIATIC NERVE, RIGHT LOWER LIMB: ICD-10-CM

## 2023-11-14 DIAGNOSIS — M70.61 TROCHANTERIC BURSITIS, RIGHT HIP: ICD-10-CM

## 2023-11-14 PROCEDURE — 20610 DRAIN/INJ JOINT/BURSA W/O US: CPT | Mod: RT

## 2023-11-14 PROCEDURE — 99213 OFFICE O/P EST LOW 20 MIN: CPT | Mod: 25

## 2023-11-14 PROCEDURE — J3490M: CUSTOM | Mod: NC

## 2023-12-06 ENCOUNTER — APPOINTMENT (OUTPATIENT)
Dept: ORTHOPEDIC SURGERY | Facility: CLINIC | Age: 68
End: 2023-12-06
Payer: MEDICARE

## 2023-12-06 VITALS — HEIGHT: 68 IN | WEIGHT: 160 LBS | BODY MASS INDEX: 24.25 KG/M2

## 2023-12-06 DIAGNOSIS — S93.321D: ICD-10-CM

## 2023-12-06 PROCEDURE — 73630 X-RAY EXAM OF FOOT: CPT | Mod: RT

## 2023-12-06 PROCEDURE — 99213 OFFICE O/P EST LOW 20 MIN: CPT

## 2024-03-06 ENCOUNTER — APPOINTMENT (OUTPATIENT)
Dept: ORTHOPEDIC SURGERY | Facility: CLINIC | Age: 69
End: 2024-03-06
Payer: MEDICARE

## 2024-03-06 DIAGNOSIS — S92.321D DISPLACED FRACTURE OF SECOND METATARSAL BONE, RIGHT FOOT, SUBSEQUENT ENCOUNTER FOR FRACTURE WITH ROUTINE HEALING: ICD-10-CM

## 2024-03-06 PROCEDURE — 73630 X-RAY EXAM OF FOOT: CPT | Mod: RT

## 2024-03-06 PROCEDURE — 99213 OFFICE O/P EST LOW 20 MIN: CPT

## 2024-03-06 NOTE — PHYSICAL EXAM
Subjective     Lady Gong is a 18 y.o. female who presents with Allergic Reaction (Here Saturday for allergic reaction ,had round of steroids. Eyes getting worse /red itchy x 4 days )      HPI:  Lady Gong is a 18 y.o. female who presents today for evaluation of bilateral eye irritation/redness.  Patient was seen in the urgent care on 7/29/2023 for evaluation of some type of an allergic reaction.  She came in because she was having 2 days of eye redness, swelling, and itching around her eyes.  She was also having hives on arms and legs at that time.  She was treated with 4 mg Decadron in the urgent care setting.  She was then prescribed a 5-day course of prednisone and hydroxyzine as well as steroid cream to apply around her eyes and to the rash on her arms and legs.  Patient reports that the majority the symptoms have improved but the eyes have not gotten better at all.  She actually feels as though her eyes might become a little bit worse.  They continue to be red and they are both itchy and painful and she is now having thick yellow discharge from them that is worse in the mornings when she first wakes up.  She does not wear contact lenses.  Has not had any visual changes.        Review of Systems   Constitutional:  Negative for chills and fever.   Eyes:  Positive for pain, discharge and redness. Negative for blurred vision, double vision and photophobia.   Skin:  Positive for itching.           PMH:  has a past medical history of Allergy.    She has no past medical history of Depression, Diabetes (HCC), Hyperlipidemia, or Hypertension.  MEDS:   Current Outpatient Medications:     polymixin-trimethoprim (POLYTRIM) 35392-9.1 UNIT/ML-% Solution, Administer 1 Drop into both eyes 4 times a day for 7 days., Disp: 10 mL, Rfl: 0    hydrOXYzine HCl (ATARAX) 25 MG Tab, Take 1 Tablet by mouth 3 times a day as needed for Itching. Causes drowsiness, do not drive or work while using. Caution with use with other sedating  "medications., Disp: 30 Tablet, Rfl: 0    triamcinolone (KENALOG) 0.025 % ointment, Apply 1 Application topically 2 times a day for 7 days., Disp: 15 g, Rfl: 0    predniSONE (DELTASONE) 20 MG Tab, Take 1 Tablet by mouth every day for 5 days. (Patient not taking: Reported on 8/3/2023), Disp: 5 Tablet, Rfl: 0    triamcinolone acetonide (KENALOG) 0.1 % Ointment, Apply thin layer to affected area twice daily as needed (Patient not taking: Reported on 8/2/2022), Disp: 1 Tube, Rfl: 2    Cetirizine HCl (ZYRTE CHILDRENS ALLERGY PO), Take  by mouth. (Patient not taking: Reported on 8/2/2022), Disp: , Rfl:   ALLERGIES: No Known Allergies  SURGHX: No past surgical history on file.  SOCHX:  reports that she has never smoked. She has never used smokeless tobacco.  FH: Family history was reviewed, no pertinent findings to report        Objective     /62   Pulse 60   Temp 37.1 °C (98.8 °F) (Temporal)   Resp 20   Ht 1.676 m (5' 6\")   Wt 52.4 kg (115 lb 9.6 oz)   SpO2 98%   BMI 18.66 kg/m²      Physical Exam  Constitutional:       General: She is not in acute distress.     Appearance: She is not diaphoretic.   HENT:      Head: Normocephalic and atraumatic.      Right Ear: External ear normal.      Left Ear: External ear normal.   Eyes:      General: Vision grossly intact.      Extraocular Movements: Extraocular movements intact.      Pupils: Pupils are equal, round, and reactive to light.      Comments: Bilateral eyes exhibit diffuse palpebral and scleral conjunctival injection with some mild palpebral conjunctival swelling noted.  There is some yellow discharge noted from the left eye at this time.  No periorbital erythema or swelling.   Pulmonary:      Effort: Pulmonary effort is normal. No respiratory distress.   Musculoskeletal:      Cervical back: Normal range of motion.   Skin:     Findings: No rash.   Neurological:      Mental Status: She is alert and oriented to person, place, and time.   Psychiatric:         " Mood and Affect: Mood and affect normal.         Cognition and Memory: Memory normal.         Judgment: Judgment normal.           Assessment & Plan     1. Acute conjunctivitis of both eyes, unspecified acute conjunctivitis type  - polymixin-trimethoprim (POLYTRIM) 43881-0.1 UNIT/ML-% Solution; Administer 1 Drop into both eyes 4 times a day for 7 days.  Dispense: 10 mL; Refill: 0  Patient likely has some underlying allergy conjunctivitis but given the fact that her symptoms are worsening and she is not having yellow discharge I believe she also now has a bacterial conjunctivitis.  We will treat this with antibiotic eyedrops.  I would also recommend that they try to follow-up with her eye doctor early next week for recheck if she is still having any residual symptoms.  She should continue use of daily antihistamines.  She may apply cool compresses to the eyes to help with discomfort and help remove discharge.  She should change her pillowcases each morning.  Importance of good hand hygiene discussed.          Differential Diagnosis, natural history, and supportive care discussed. Return to the Urgent Care or follow up with your PCP if symptoms fail to resolve, or for any new or worsening symptoms. Emergency room precautions discussed. Patient and/or family appears understanding of information.           [Right] : right foot and ankle [NL (40)] : plantar flexion 40 degrees [NL 30)] : inversion 30 degrees [NL (20)] : eversion 20 degrees [5___] : [facility] 5[unfilled]/5 [2+] : dorsalis pedis pulse: 2+ [] : no calf tenderness [FreeTextEntry8] : no sig ttp

## 2024-03-06 NOTE — HISTORY OF PRESENT ILLNESS
[1] : 2 [0] : 0 [Throbbing] : throbbing [de-identified] : 4/17/23: fall 1 week ago w/ foot pain. [facility]. no prior sig foot probs. denies dm/tob.   04/26/2023:ORIF LIsfranc with 2nd tmt fusion  05/08/2023: no complaints. nwb in splint. Patient denies fevers, chills, or drainage.  05/22/2023: no complaints. nwb in boot and scooter. Patient denies fevers, chills, or drainage.  taking asa  06/05/2023: no complaints. nwb in boot and scooter. Patient denies fevers, chills, drainage.   06/26/2023:  improving. partial wb in boot. going to PT. Patient denies fevers, chills, or drainage.  07/17/2023: improving. walking in boot. going to PT. Patient denies fevers, chills, or drainage.  09/06/2023: improving. walking in regular shoes. going to PT. Patient denies fevers, chills, or drainage.  12/06/2023: improving. walking in regular shoes. completed PT. Patient denies fevers, chills, or drainage.  03/06/2024:  improving. some discomfort when barefoot. HEP., no new injury. Patient denies fevers, chills, or drainage. [] : This patient has had an injection before: no [FreeTextEntry1] : RT Foot [FreeTextEntry6] : numbness  [de-identified] : X-ray, CT [de-identified] : 4.26.23 [de-identified] : RT foot ORIF

## 2024-03-26 ENCOUNTER — APPOINTMENT (OUTPATIENT)
Dept: OTOLARYNGOLOGY | Facility: CLINIC | Age: 69
End: 2024-03-26

## 2024-04-30 ENCOUNTER — APPOINTMENT (OUTPATIENT)
Dept: OTOLARYNGOLOGY | Facility: CLINIC | Age: 69
End: 2024-04-30
Payer: MEDICARE

## 2024-04-30 VITALS
DIASTOLIC BLOOD PRESSURE: 69 MMHG | HEIGHT: 68 IN | HEART RATE: 70 BPM | SYSTOLIC BLOOD PRESSURE: 96 MMHG | BODY MASS INDEX: 24.25 KG/M2 | WEIGHT: 160 LBS

## 2024-04-30 DIAGNOSIS — H93.13 TINNITUS, BILATERAL: ICD-10-CM

## 2024-04-30 DIAGNOSIS — J34.89 NASAL CONGESTION: ICD-10-CM

## 2024-04-30 DIAGNOSIS — J34.2 DEVIATED NASAL SEPTUM: ICD-10-CM

## 2024-04-30 DIAGNOSIS — J31.0 CHRONIC RHINITIS: ICD-10-CM

## 2024-04-30 DIAGNOSIS — H90.5 UNSPECIFIED SENSORINEURAL HEARING LOSS: ICD-10-CM

## 2024-04-30 DIAGNOSIS — R09.81 NASAL CONGESTION: ICD-10-CM

## 2024-04-30 PROCEDURE — 92550 TYMPANOMETRY & REFLEX THRESH: CPT

## 2024-04-30 PROCEDURE — 31231 NASAL ENDOSCOPY DX: CPT

## 2024-04-30 PROCEDURE — 99213 OFFICE O/P EST LOW 20 MIN: CPT | Mod: 25

## 2024-04-30 PROCEDURE — 92557 COMPREHENSIVE HEARING TEST: CPT

## 2024-04-30 RX ORDER — FLUTICASONE PROPIONATE 50 UG/1
50 SPRAY, METERED NASAL
Qty: 16 | Refills: 3 | Status: ACTIVE | COMMUNITY
Start: 2024-04-30 | End: 1900-01-01

## 2024-04-30 NOTE — DATA REVIEWED
[de-identified] : Audio 4/30/24: -ETF normal AU -Borderline -8000 Hz with a mild SNHL dip at 500-1000 Hz AU -100% understanding at 65 dB bilaterally

## 2024-04-30 NOTE — PHYSICAL EXAM
[Hearing Bolanos Test (Tuning Fork On Forehead)] : no lateralization of tone [] : septum deviated to the right [Midline] : trachea located in midline position [Normal] : orientation to person, place, and time: normal [Hearing Loss Right Only] : normal [Hearing Loss Left Only] : normal [de-identified] : mildly inflamed turbinates bilaterally [FreeTextEntry2] :  sinuses nontender to percussion.  sensations intact

## 2024-04-30 NOTE — PROCEDURE
[Recalcitrant Symptoms] : recalcitrant symptoms  [FreeTextEntry6] :  Procedure: Flexible Nasal Endoscopy: Risks, benefits, and alternatives of flexible endoscopy were explained to the patient. The patient gave oral consent to proceed. The flexible scope was inserted into the right nasal cavity. Endoscopy of the inferior and middle meatus was performed. No polyp, mass, or lesion was appreciated. Olfactory cleft was clear. Spheno-ethmoid recess is clear. Nasopharynx was clear. Turbinates were without mass. The procedure was repeated on the contralateral side with similar findings. -spur off of the septum on the left side posteriorly -deviated septum along the floor on the right -inferior turbinate hypertrophy bilaterally -left middle turbinate hypertrophy

## 2024-04-30 NOTE — HISTORY OF PRESENT ILLNESS
[de-identified] : Pt. with h/o b/l tinnitus, chronic rhinitis, hypothyroidism, and SNHL presents today for a follow up. She states that she is doing well. She states that she still has tinnitus with no improvement from Arches Tinnitus Formula and Lipoflavonoids. She denies changes in her hearing. She states that she always has to blow her nose in the morning. She states that she had improvement from Flonase.

## 2024-04-30 NOTE — ASSESSMENT
[FreeTextEntry1] : Reviewed and reconciled medications, allergies, PMHx, PSHx, SocHx, FMHx.  Pt. with h/o b/l tinnitus, chronic rhinitis, hypothyroidism, and SNHL presents today for a follow up. She states that she is doing well. She states that she still has tinnitus with no improvement from Arches Tinnitus Formula and Lipoflavonoids. She denies changes in her hearing. She states that she always has to blow her nose in the morning. She states that she had improvement from Flonase.   Physical exam: -no lateralization to tuning forks -deviated septum right without obstruction -mildly inflamed turbinates bilaterally  ENT Procedure:  Flexible nasal endoscopy -spur off of the septum on the left side posteriorly -deviated septum along the floor on the right -inferior turbinate hypertrophy bilaterally -left middle turbinate hypertrophy  Audio 4/30/24: -ETF normal AU -Borderline -8000 Hz with a mild SNHL dip at 500-1000 Hz AU -100% understanding at 65 dB bilaterally   Plan:  Audio - results interpreted by Dr. Romero and reviewed with the patient. -Continue Flonase - 2 sprays bilaterally QD, spray laterally. -FU in 1 year

## 2024-04-30 NOTE — CONSULT LETTER
[Dear  ___] : Dear  [unfilled], [Courtesy Letter:] : I had the pleasure of seeing your patient, [unfilled], in my office today. [Please see my note below.] : Please see my note below. [Consult Closing:] : Thank you very much for allowing me to participate in the care of this patient.  If you have any questions, please do not hesitate to contact me. [Sincerely,] : Sincerely, [FreeTextEntry3] :  Willis Romero MD FACS

## 2024-04-30 NOTE — ADDENDUM
[FreeTextEntry1] :  Documented by Shady James acting as scribe for Dr. Romero on 04/30/2024. All Medical record entries made by the Scribe were at my, Dr. Romero, direction and personally dictated by me on 04/30/2024 . I have reviewed the chart and agree that the record accurately reflects my personal performance of the history, physical exam, assessment and plan. I have also personally directed, reviewed, and agreed with the discharge instructions.

## 2024-05-22 ENCOUNTER — APPOINTMENT (OUTPATIENT)
Dept: ORTHOPEDIC SURGERY | Facility: CLINIC | Age: 69
End: 2024-05-22
Payer: MEDICARE

## 2024-05-22 VITALS — HEIGHT: 68 IN | WEIGHT: 160 LBS | BODY MASS INDEX: 24.25 KG/M2

## 2024-05-22 DIAGNOSIS — M48.50XA COLLAPSED VERTEBRA, NOT ELSEWHERE CLASSIFIED, SITE UNSPECIFIED, INITIAL ENCOUNTER FOR FRACTURE: ICD-10-CM

## 2024-05-22 DIAGNOSIS — M43.16 SPONDYLOLISTHESIS, LUMBAR REGION: ICD-10-CM

## 2024-05-22 DIAGNOSIS — M53.3 SACROCOCCYGEAL DISORDERS, NOT ELSEWHERE CLASSIFIED: ICD-10-CM

## 2024-05-22 PROCEDURE — 72200 X-RAY EXAM SI JOINTS: CPT

## 2024-05-22 PROCEDURE — 99214 OFFICE O/P EST MOD 30 MIN: CPT

## 2024-05-22 PROCEDURE — 72110 X-RAY EXAM L-2 SPINE 4/>VWS: CPT

## 2024-05-22 NOTE — HISTORY OF PRESENT ILLNESS
[de-identified] : 05/22/2024; 68 yr old female presents today in follow up with complaints of coccyx pain after hitting [place] structure at park. Injury occure 2.5 weeks ago, noted to have +TTP. She is currently utilizing cushion when sitting. She isnt taking any medications at this time.  She reports having chronic low back symptoms which is managed with HEP. At last visit she presented wiht GT bursitis which has improved with CSI and PT. She continues to HEP and acupuncture to area.

## 2024-05-22 NOTE — ASSESSMENT
[FreeTextEntry1] : 68 F with low back  and coccydynia x-rays with possible coccyx fracture Orthotics cushion HEP for low back Pelvic floor PT FU 6 weeks

## 2024-05-22 NOTE — IMAGING
[Facet arthropathy] : Facet arthropathy [Disc space narrowing] : Disc space narrowing [Fracture] : Fracture [FreeTextEntry1] : chronic VCF unchanged from previous  imaging [FreeTextEntry2] : coccyx fracture

## 2024-07-31 ENCOUNTER — APPOINTMENT (OUTPATIENT)
Dept: ORTHOPEDIC SURGERY | Facility: CLINIC | Age: 69
End: 2024-07-31
Payer: MEDICARE

## 2024-07-31 VITALS — BODY MASS INDEX: 24.25 KG/M2 | WEIGHT: 160 LBS | HEIGHT: 68 IN

## 2024-07-31 DIAGNOSIS — M53.3 SACROCOCCYGEAL DISORDERS, NOT ELSEWHERE CLASSIFIED: ICD-10-CM

## 2024-07-31 PROCEDURE — 99213 OFFICE O/P EST LOW 20 MIN: CPT

## 2024-07-31 NOTE — ASSESSMENT
[FreeTextEntry1] : 68 F with low back  and coccydynia Pain improved with HEP and PT Discussed possibility of injections of back pain recurs FU PRN

## 2024-07-31 NOTE — HISTORY OF PRESENT ILLNESS
[de-identified] : 7/31/24  Pain improved with PT/HEP   05/22/2024; 68 yr old female presents today in follow up with complaints of coccyx pain after hitting into structure at park. Injury occure 2.5 weeks ago, noted to have +TTP. She is currently utilizing cushion when sitting. She isnt taking any medications at this time.  She reports having chronic low back symptoms which is managed with HEP. At last visit she presented wiht GT bursitis which has improved with CSI and PT. She continues to HEP and acupuncture to area.

## 2024-09-10 ENCOUNTER — APPOINTMENT (OUTPATIENT)
Dept: PULMONOLOGY | Facility: CLINIC | Age: 69
End: 2024-09-10

## 2024-10-28 ENCOUNTER — APPOINTMENT (OUTPATIENT)
Dept: OTOLARYNGOLOGY | Facility: CLINIC | Age: 69
End: 2024-10-28
Payer: MEDICARE

## 2024-10-28 VITALS
DIASTOLIC BLOOD PRESSURE: 75 MMHG | BODY MASS INDEX: 23.79 KG/M2 | HEIGHT: 68 IN | HEART RATE: 79 BPM | SYSTOLIC BLOOD PRESSURE: 108 MMHG | WEIGHT: 157 LBS

## 2024-10-28 DIAGNOSIS — H90.5 UNSPECIFIED SENSORINEURAL HEARING LOSS: ICD-10-CM

## 2024-10-28 DIAGNOSIS — R09.81 NASAL CONGESTION: ICD-10-CM

## 2024-10-28 DIAGNOSIS — R06.5 MOUTH BREATHING: ICD-10-CM

## 2024-10-28 DIAGNOSIS — H93.13 TINNITUS, BILATERAL: ICD-10-CM

## 2024-10-28 DIAGNOSIS — J34.89 OTHER SPECIFIED DISORDERS OF NOSE AND NASAL SINUSES: ICD-10-CM

## 2024-10-28 DIAGNOSIS — J34.89 NASAL CONGESTION: ICD-10-CM

## 2024-10-28 DIAGNOSIS — J31.0 CHRONIC RHINITIS: ICD-10-CM

## 2024-10-28 DIAGNOSIS — G25.3 MYOCLONUS: ICD-10-CM

## 2024-10-28 PROCEDURE — 92557 COMPREHENSIVE HEARING TEST: CPT

## 2024-10-28 PROCEDURE — 92550 TYMPANOMETRY & REFLEX THRESH: CPT

## 2024-10-28 PROCEDURE — 99214 OFFICE O/P EST MOD 30 MIN: CPT

## 2025-03-03 ENCOUNTER — APPOINTMENT (OUTPATIENT)
Dept: CT IMAGING | Facility: CLINIC | Age: 70
End: 2025-03-03
Payer: SELF-PAY

## 2025-03-03 PROCEDURE — 75571 CT HRT W/O DYE W/CA TEST: CPT

## 2025-03-31 ENCOUNTER — APPOINTMENT (OUTPATIENT)
Dept: ORTHOPEDIC SURGERY | Facility: CLINIC | Age: 70
End: 2025-03-31
Payer: MEDICARE

## 2025-03-31 VITALS — BODY MASS INDEX: 23.79 KG/M2 | WEIGHT: 157 LBS | HEIGHT: 68 IN

## 2025-03-31 DIAGNOSIS — M43.16 SPONDYLOLISTHESIS, LUMBAR REGION: ICD-10-CM

## 2025-03-31 PROCEDURE — 72110 X-RAY EXAM L-2 SPINE 4/>VWS: CPT

## 2025-03-31 PROCEDURE — 99213 OFFICE O/P EST LOW 20 MIN: CPT

## 2025-03-31 RX ORDER — MELOXICAM 15 MG/1
15 TABLET ORAL DAILY
Qty: 28 | Refills: 1 | Status: ACTIVE | COMMUNITY
Start: 2025-03-31 | End: 1900-01-01

## 2025-05-16 ENCOUNTER — APPOINTMENT (OUTPATIENT)
Dept: ORTHOPEDIC SURGERY | Facility: CLINIC | Age: 70
End: 2025-05-16

## 2025-05-20 ENCOUNTER — APPOINTMENT (OUTPATIENT)
Dept: OTOLARYNGOLOGY | Facility: CLINIC | Age: 70
End: 2025-05-20
Payer: MEDICARE

## 2025-05-20 VITALS
HEIGHT: 68 IN | SYSTOLIC BLOOD PRESSURE: 107 MMHG | WEIGHT: 158 LBS | DIASTOLIC BLOOD PRESSURE: 67 MMHG | BODY MASS INDEX: 23.95 KG/M2 | HEART RATE: 80 BPM

## 2025-05-20 DIAGNOSIS — H93.13 TINNITUS, BILATERAL: ICD-10-CM

## 2025-05-20 DIAGNOSIS — H90.5 UNSPECIFIED SENSORINEURAL HEARING LOSS: ICD-10-CM

## 2025-05-20 DIAGNOSIS — J34.2 DEVIATED NASAL SEPTUM: ICD-10-CM

## 2025-05-20 DIAGNOSIS — J31.0 CHRONIC RHINITIS: ICD-10-CM

## 2025-05-20 DIAGNOSIS — R09.81 NASAL CONGESTION: ICD-10-CM

## 2025-05-20 DIAGNOSIS — J34.89 NASAL CONGESTION: ICD-10-CM

## 2025-05-20 PROCEDURE — 99213 OFFICE O/P EST LOW 20 MIN: CPT

## 2025-07-03 ENCOUNTER — APPOINTMENT (OUTPATIENT)
Dept: ORTHOPEDIC SURGERY | Facility: CLINIC | Age: 70
End: 2025-07-03
Payer: MEDICARE

## 2025-07-03 PROCEDURE — 73080 X-RAY EXAM OF ELBOW: CPT | Mod: LT

## 2025-07-03 PROCEDURE — 99203 OFFICE O/P NEW LOW 30 MIN: CPT

## 2025-07-03 PROCEDURE — 99213 OFFICE O/P EST LOW 20 MIN: CPT

## 2025-08-07 ENCOUNTER — APPOINTMENT (OUTPATIENT)
Dept: ORTHOPEDIC SURGERY | Facility: CLINIC | Age: 70
End: 2025-08-07

## 2025-08-07 VITALS — WEIGHT: 156 LBS | BODY MASS INDEX: 23.64 KG/M2 | HEIGHT: 68 IN

## 2025-08-07 DIAGNOSIS — M23.91 UNSPECIFIED INTERNAL DERANGEMENT OF RIGHT KNEE: ICD-10-CM

## 2025-08-07 DIAGNOSIS — S82.031A DISPLACED TRANSVERSE FRACTURE OF RIGHT PATELLA, INITIAL ENCOUNTER FOR CLOSED FRACTURE: ICD-10-CM

## 2025-08-07 PROCEDURE — 99214 OFFICE O/P EST MOD 30 MIN: CPT

## 2025-08-07 PROCEDURE — L1845: CPT | Mod: KX,RT

## 2025-08-07 PROCEDURE — 73560 X-RAY EXAM OF KNEE 1 OR 2: CPT | Mod: RT

## 2025-08-08 ENCOUNTER — APPOINTMENT (OUTPATIENT)
Dept: ORTHOPEDIC SURGERY | Facility: CLINIC | Age: 70
End: 2025-08-08
Payer: MEDICARE

## 2025-08-08 VITALS — HEIGHT: 68 IN | WEIGHT: 156 LBS | BODY MASS INDEX: 23.64 KG/M2

## 2025-08-08 DIAGNOSIS — S82.041A DISPLACED COMMINUTED FRACTURE OF RIGHT PATELLA, INITIAL ENCOUNTER FOR CLOSED FRACTURE: ICD-10-CM

## 2025-08-08 PROCEDURE — 99214 OFFICE O/P EST MOD 30 MIN: CPT

## 2025-08-08 RX ORDER — CYCLOBENZAPRINE HYDROCHLORIDE 5 MG/1
5 TABLET, FILM COATED ORAL
Qty: 20 | Refills: 1 | Status: ACTIVE | COMMUNITY
Start: 2025-08-08 | End: 1900-01-01

## 2025-08-12 ENCOUNTER — APPOINTMENT (OUTPATIENT)
Dept: ORTHOPEDIC SURGERY | Facility: AMBULATORY SURGERY CENTER | Age: 70
End: 2025-08-12

## 2025-08-12 PROCEDURE — 27524 TREAT KNEECAP FRACTURE: CPT | Mod: RT

## 2025-08-12 RX ORDER — ASPIRIN 325 MG/1
325 TABLET, FILM COATED ORAL TWICE DAILY
Qty: 60 | Refills: 0 | Status: ACTIVE | COMMUNITY
Start: 2025-08-12 | End: 1900-01-01

## 2025-08-15 RX ORDER — OXYCODONE AND ACETAMINOPHEN 5; 325 MG/1; MG/1
5-325 TABLET ORAL
Qty: 30 | Refills: 0 | Status: ACTIVE | COMMUNITY
Start: 2025-08-09 | End: 1900-01-01

## 2025-08-20 ENCOUNTER — APPOINTMENT (OUTPATIENT)
Dept: ORTHOPEDIC SURGERY | Facility: CLINIC | Age: 70
End: 2025-08-20
Payer: MEDICARE

## 2025-08-20 DIAGNOSIS — S82.041A DISPLACED COMMINUTED FRACTURE OF RIGHT PATELLA, INITIAL ENCOUNTER FOR CLOSED FRACTURE: ICD-10-CM

## 2025-08-20 DIAGNOSIS — Z98.890 OTHER SPECIFIED POSTPROCEDURAL STATES: ICD-10-CM

## 2025-08-20 PROCEDURE — 99024 POSTOP FOLLOW-UP VISIT: CPT

## 2025-08-20 PROCEDURE — 73560 X-RAY EXAM OF KNEE 1 OR 2: CPT | Mod: RT

## 2025-08-27 ENCOUNTER — NON-APPOINTMENT (OUTPATIENT)
Age: 70
End: 2025-08-27

## 2025-09-05 ENCOUNTER — APPOINTMENT (OUTPATIENT)
Dept: ORTHOPEDIC SURGERY | Facility: CLINIC | Age: 70
End: 2025-09-05
Payer: MEDICARE

## 2025-09-05 DIAGNOSIS — Z98.890 OTHER SPECIFIED POSTPROCEDURAL STATES: ICD-10-CM

## 2025-09-05 DIAGNOSIS — S82.031A DISPLACED TRANSVERSE FRACTURE OF RIGHT PATELLA, INITIAL ENCOUNTER FOR CLOSED FRACTURE: ICD-10-CM

## 2025-09-05 PROCEDURE — 73560 X-RAY EXAM OF KNEE 1 OR 2: CPT | Mod: RT

## 2025-09-05 PROCEDURE — 99024 POSTOP FOLLOW-UP VISIT: CPT

## 2025-09-05 RX ORDER — CYCLOBENZAPRINE HYDROCHLORIDE 5 MG/1
5 TABLET, FILM COATED ORAL
Qty: 30 | Refills: 1 | Status: ACTIVE | COMMUNITY
Start: 2025-09-05 | End: 1900-01-01

## (undated) DEVICE — DRAPE C ARM C-ARMOUR

## (undated) DEVICE — PACKING GAUZE PLAIN 1"

## (undated) DEVICE — GLV 8 PROTEXIS (WHITE)

## (undated) DEVICE — WOUND IRR SURGIPHOR

## (undated) DEVICE — POSITIONER FOAM HEAD CRADLE (PINK)

## (undated) DEVICE — PACK LOWER EXTREMITY

## (undated) DEVICE — LABELS BLANK W PEN

## (undated) DEVICE — DRAPE TOWEL BLUE 17" X 24"

## (undated) DEVICE — DRAPE 3/4 SHEET 52X76"

## (undated) DEVICE — SYR ASEPTO

## (undated) DEVICE — PREP CHLORAPREP HI-LITE ORANGE 26ML

## (undated) DEVICE — CANISTER SUCTION LID GUARD 3000CC

## (undated) DEVICE — DRAPE MAYO STAND 30"

## (undated) DEVICE — DRSG KLING 2"

## (undated) DEVICE — DRSG CURITY GAUZE SPONGE 4 X 4" 12-PLY

## (undated) DEVICE — DRAPE U POLY BLUE 60X72"

## (undated) DEVICE — WOUND IRR IRRISEPT W 0.5 CHG

## (undated) DEVICE — SUT POLYSORB 3-0 30" P-12 UNDYED

## (undated) DEVICE — DRSG COBAN 4"

## (undated) DEVICE — WARMING BLANKET UPPER ADULT

## (undated) DEVICE — DRAPE IOBAN 23" X 23"

## (undated) DEVICE — DRSG XEROFORM 5 X 9"

## (undated) DEVICE — POSITIONER STRAP ARMBOARD VELCRO TS-30

## (undated) DEVICE — PLASTIC SOLUTION BOWL 160Z

## (undated) DEVICE — TOURNIQUET CUFF 34" DUAL PORT W PLC

## (undated) DEVICE — DRSG WEBRIL 6"

## (undated) DEVICE — WRIGHT MEDICAL SIZER STAPLE FUSEFORCE

## (undated) DEVICE — DRILL BIT WRIGHT MEDICAL 2.0MM

## (undated) DEVICE — GLV 7.5 PROTEXIS (BLUE)

## (undated) DEVICE — VENODYNE/SCD SLEEVE CALF LARGE

## (undated) DEVICE — DRAIN JACKSON PRATT 10FR ROUND

## (undated) DEVICE — TOURNIQUET ESMARK 6"